# Patient Record
(demographics unavailable — no encounter records)

---

## 2025-01-27 NOTE — PHYSICAL EXAM
[Brushes teeth with help] : brushes teeth with help [Well Developed] : well developed [Feeds doll] : feeds doll [Well Nourished] : well nourished [No Acute Distress] : no acute distress [Removes garments] : removes garments [Normal Conjunctiva] : normal conjunctiva [Uses spoon/fork] : uses spoon/fork [Laughs with others] : laughs with others [Normal Venous Pressure] : normal venous pressure [No Carotid Bruit] : no carotid bruit [Scribbles] : scribbles  [Drinks from cup without spilling] : drinks from cup without spilling [Normal S1, S2] : normal S1, S2 [No Murmur] : no murmur [Speech half understandable] : speech half understandable [Combines words] : combines words [No Rub] : no rub [No Gallop] : no gallop [Points to pictures] : points to pictures [Clear Lung Fields] : clear lung fields [Understands 2 step commands] : understands 2 step commands [Good Air Entry] : good air entry [Says >10 words] : says >10 words [No Respiratory Distress] : no respiratory distress  [Points to 1 body part] : points to 1 body part [Soft] : abdomen soft [Throws ball overhead] : throws ball overhead [Kicks ball forward] : kicks ball forward [Non Tender] : non-tender [No Masses/organomegaly] : no masses/organomegaly [Walks up steps] : walks up steps [Normal Bowel Sounds] : normal bowel sounds [Runs] : runs [Normal Gait] : normal gait [Passed] : passed [No Edema] : no edema [No Cyanosis] : no cyanosis [No Clubbing] : no clubbing [No Varicosities] : no varicosities [No Rash] : no rash [No Skin Lesions] : no skin lesions [Moves all extremities] : moves all extremities [No Focal Deficits] : no focal deficits [Normal Speech] : normal speech [Alert and Oriented] : alert and oriented [Normal memory] : normal memory

## 2025-01-27 NOTE — HISTORY OF PRESENT ILLNESS
[FreeTextEntry1] : This is a 58 year y/o male with a PMHx of HTN presents today for follow up.  Last office visit 11/2023  Pt checks his BP at home - numbers 120s/80s. Pt has situational HTN.   Pt has been told to get CT calcium score, however pt did not get it.  The patient is here for evaluation of high blood pressure. Currently tolerating antihypertensive medications. Denies headaches, stiff neck, visual changes, or PND. The patient has been trying to stay on a low-sodium diet. On amlodipine- Olmesartan 5-20mg.  The patient denies fever, chills, weight loss, malaise, rash, recent travel, insect bites, alteration bowel habits, headaches, weakness, abdominal pain, bloating, changes in urination, visual disturbances, shortness of breath, chest pain, dizziness, palpitations.